# Patient Record
Sex: MALE | Employment: UNEMPLOYED | URBAN - NONMETROPOLITAN AREA
[De-identification: names, ages, dates, MRNs, and addresses within clinical notes are randomized per-mention and may not be internally consistent; named-entity substitution may affect disease eponyms.]

---

## 2021-10-25 ENCOUNTER — HOSPITAL ENCOUNTER (EMERGENCY)
Facility: HOSPITAL | Age: 26
Discharge: HOME | End: 2021-10-25
Attending: STUDENT IN AN ORGANIZED HEALTH CARE EDUCATION/TRAINING PROGRAM
Payer: MEDICARE

## 2021-10-25 VITALS
HEIGHT: 77 IN | TEMPERATURE: 97.7 F | SYSTOLIC BLOOD PRESSURE: 104 MMHG | OXYGEN SATURATION: 98 % | HEART RATE: 65 BPM | WEIGHT: 177.91 LBS | BODY MASS INDEX: 21.01 KG/M2 | DIASTOLIC BLOOD PRESSURE: 59 MMHG | RESPIRATION RATE: 21 BRPM

## 2021-10-25 DIAGNOSIS — G40.919 BREAKTHROUGH SEIZURE (HCC): Primary | ICD-10-CM

## 2021-10-25 LAB
ALBUMIN SERPL BCP-MCNC: 3.8 G/DL (ref 3.5–5)
ALP SERPL-CCNC: 75 U/L (ref 46–116)
ALT SERPL W P-5'-P-CCNC: 14 U/L (ref 12–78)
ANION GAP SERPL CALCULATED.3IONS-SCNC: 6 MMOL/L (ref 4–13)
AST SERPL W P-5'-P-CCNC: 14 U/L (ref 5–45)
ATRIAL RATE: 82 BPM
BASOPHILS # BLD AUTO: 0.06 THOUSANDS/ΜL (ref 0–0.1)
BASOPHILS NFR BLD AUTO: 1 % (ref 0–1)
BILIRUB SERPL-MCNC: 0.45 MG/DL (ref 0.2–1)
BUN SERPL-MCNC: 13 MG/DL (ref 5–25)
CALCIUM SERPL-MCNC: 8.7 MG/DL (ref 8.3–10.1)
CHLORIDE SERPL-SCNC: 103 MMOL/L (ref 100–108)
CO2 SERPL-SCNC: 27 MMOL/L (ref 21–32)
CREAT SERPL-MCNC: 1.17 MG/DL (ref 0.6–1.3)
EOSINOPHIL # BLD AUTO: 0.1 THOUSAND/ΜL (ref 0–0.61)
EOSINOPHIL NFR BLD AUTO: 2 % (ref 0–6)
ERYTHROCYTE [DISTWIDTH] IN BLOOD BY AUTOMATED COUNT: 12.1 % (ref 11.6–15.1)
GFR SERPL CREATININE-BSD FRML MDRD: 85 ML/MIN/1.73SQ M
GLUCOSE SERPL-MCNC: 74 MG/DL (ref 65–140)
GLUCOSE SERPL-MCNC: 86 MG/DL (ref 65–140)
HCT VFR BLD AUTO: 44.5 % (ref 36.5–49.3)
HGB BLD-MCNC: 14.9 G/DL (ref 12–17)
IMM GRANULOCYTES # BLD AUTO: 0.02 THOUSAND/UL (ref 0–0.2)
IMM GRANULOCYTES NFR BLD AUTO: 0 % (ref 0–2)
LYMPHOCYTES # BLD AUTO: 1.29 THOUSANDS/ΜL (ref 0.6–4.47)
LYMPHOCYTES NFR BLD AUTO: 24 % (ref 14–44)
MCH RBC QN AUTO: 32.9 PG (ref 26.8–34.3)
MCHC RBC AUTO-ENTMCNC: 33.5 G/DL (ref 31.4–37.4)
MCV RBC AUTO: 98 FL (ref 82–98)
MONOCYTES # BLD AUTO: 0.45 THOUSAND/ΜL (ref 0.17–1.22)
MONOCYTES NFR BLD AUTO: 8 % (ref 4–12)
NEUTROPHILS # BLD AUTO: 3.47 THOUSANDS/ΜL (ref 1.85–7.62)
NEUTS SEG NFR BLD AUTO: 65 % (ref 43–75)
NRBC BLD AUTO-RTO: 0 /100 WBCS
P AXIS: 72 DEGREES
PLATELET # BLD AUTO: 220 THOUSANDS/UL (ref 149–390)
PMV BLD AUTO: 8.8 FL (ref 8.9–12.7)
POTASSIUM SERPL-SCNC: 4.9 MMOL/L (ref 3.5–5.3)
PR INTERVAL: 170 MS
PROT SERPL-MCNC: 7.1 G/DL (ref 6.4–8.2)
QRS AXIS: 55 DEGREES
QRSD INTERVAL: 84 MS
QT INTERVAL: 388 MS
QTC INTERVAL: 453 MS
RBC # BLD AUTO: 4.53 MILLION/UL (ref 3.88–5.62)
SODIUM SERPL-SCNC: 136 MMOL/L (ref 136–145)
T WAVE AXIS: 46 DEGREES
VENTRICULAR RATE: 82 BPM
WBC # BLD AUTO: 5.39 THOUSAND/UL (ref 4.31–10.16)

## 2021-10-25 PROCEDURE — 93005 ELECTROCARDIOGRAM TRACING: CPT

## 2021-10-25 PROCEDURE — 36415 COLL VENOUS BLD VENIPUNCTURE: CPT | Performed by: PHYSICIAN ASSISTANT

## 2021-10-25 PROCEDURE — 80177 DRUG SCRN QUAN LEVETIRACETAM: CPT | Performed by: PHYSICIAN ASSISTANT

## 2021-10-25 PROCEDURE — 85025 COMPLETE CBC W/AUTO DIFF WBC: CPT | Performed by: PHYSICIAN ASSISTANT

## 2021-10-25 PROCEDURE — 93010 ELECTROCARDIOGRAM REPORT: CPT | Performed by: INTERNAL MEDICINE

## 2021-10-25 PROCEDURE — 99284 EMERGENCY DEPT VISIT MOD MDM: CPT

## 2021-10-25 PROCEDURE — 99284 EMERGENCY DEPT VISIT MOD MDM: CPT | Performed by: PHYSICIAN ASSISTANT

## 2021-10-25 PROCEDURE — 80053 COMPREHEN METABOLIC PANEL: CPT | Performed by: PHYSICIAN ASSISTANT

## 2021-10-25 PROCEDURE — 82948 REAGENT STRIP/BLOOD GLUCOSE: CPT

## 2021-10-25 RX ORDER — LEVETIRACETAM 500 MG/1
1000 TABLET ORAL EVERY 12 HOURS SCHEDULED
COMMUNITY
End: 2021-10-25

## 2021-10-25 RX ORDER — LEVETIRACETAM 750 MG/1
750 TABLET ORAL 2 TIMES DAILY
Qty: 60 TABLET | Refills: 0 | Status: SHIPPED | OUTPATIENT
Start: 2021-10-25 | End: 2021-11-24

## 2021-10-25 RX ADMIN — LEVETIRACETAM 1500 MG: 100 INJECTION, SOLUTION INTRAVENOUS at 13:58

## 2021-10-27 ENCOUNTER — TELEPHONE (OUTPATIENT)
Dept: NEUROLOGY | Facility: CLINIC | Age: 26
End: 2021-10-27

## 2021-10-27 LAB — LEVETIRACETAM SERPL-MCNC: 15.6 UG/ML (ref 10–40)

## 2024-06-25 ENCOUNTER — OFFICE VISIT (OUTPATIENT)
Dept: FAMILY MEDICINE | Facility: CLINIC | Age: 29
End: 2024-06-25
Payer: COMMERCIAL

## 2024-06-25 VITALS
HEART RATE: 69 BPM | HEIGHT: 77 IN | SYSTOLIC BLOOD PRESSURE: 112 MMHG | RESPIRATION RATE: 16 BRPM | DIASTOLIC BLOOD PRESSURE: 68 MMHG | WEIGHT: 193.6 LBS | BODY MASS INDEX: 22.86 KG/M2 | OXYGEN SATURATION: 99 %

## 2024-06-25 DIAGNOSIS — R41.3 MEMORY IMPAIRMENT: ICD-10-CM

## 2024-06-25 DIAGNOSIS — F32.A ANXIETY AND DEPRESSION: ICD-10-CM

## 2024-06-25 DIAGNOSIS — F41.9 ANXIETY AND DEPRESSION: ICD-10-CM

## 2024-06-25 DIAGNOSIS — G40.909 SEIZURE DISORDER (CMS/HCC): Primary | ICD-10-CM

## 2024-06-25 PROCEDURE — 99203 OFFICE O/P NEW LOW 30 MIN: CPT | Performed by: NURSE PRACTITIONER

## 2024-06-25 PROCEDURE — 3008F BODY MASS INDEX DOCD: CPT | Performed by: NURSE PRACTITIONER

## 2024-06-25 RX ORDER — LEVETIRACETAM 1000 MG/1
1000 TABLET ORAL 2 TIMES DAILY
COMMUNITY
End: 2024-06-25

## 2024-06-25 RX ORDER — ESCITALOPRAM OXALATE 5 MG/1
5 TABLET ORAL DAILY
COMMUNITY
Start: 2024-03-26 | End: 2024-07-24

## 2024-06-25 RX ORDER — LACOSAMIDE 100 MG/1
TABLET ORAL
COMMUNITY

## 2024-06-25 RX ORDER — LACOSAMIDE 200 MG/1
200 TABLET ORAL EVERY 12 HOURS
COMMUNITY
Start: 2024-06-06 | End: 2025-06-06

## 2024-06-25 RX ORDER — ZONISAMIDE 100 MG/1
400 CAPSULE ORAL DAILY
COMMUNITY
Start: 2024-01-28

## 2024-06-25 RX ORDER — QUETIAPINE FUMARATE 25 MG/1
25 TABLET, FILM COATED ORAL NIGHTLY
COMMUNITY
Start: 2024-03-26

## 2024-06-25 RX ORDER — LORAZEPAM 1 MG/1
1 TABLET ORAL DAILY PRN
COMMUNITY
Start: 2024-02-26

## 2024-06-25 ASSESSMENT — ENCOUNTER SYMPTOMS
SEIZURES: 0
WHEEZING: 0
DYSPHORIC MOOD: 1
NERVOUS/ANXIOUS: 1
SHORTNESS OF BREATH: 0
COUGH: 0
PALPITATIONS: 0

## 2024-06-25 ASSESSMENT — PATIENT HEALTH QUESTIONNAIRE - PHQ9: SUM OF ALL RESPONSES TO PHQ9 QUESTIONS 1 & 2: 0

## 2024-06-25 NOTE — PROGRESS NOTES
Reason for visit:   Chief Complaint   Patient presents with    Seizures       HPI  is a 28 y.o. male     HPI    Here to establish care.     Epilepsy: now well controlled on medication since med change 2/2024, previously was having seizures about monthly, following with [facility]. Lorazapem was for PRN use for seizures but hasn't needed   Depression/anxiety: following with [facility] (dr gallito england), referred to a few different programs, will be starting therapy soon. Quietapine is meant to be taken PRN before bed buit he has not yet had to take it.  Memory/speech issues: seizure related, neurospych referred him to an epilepsy specific program called [facility] (scheduled to start around September)     Hx of skull surgery in first year of life- craniosynostosis.     Problem List:  Patient Active Problem List    Diagnosis Date Noted    Seizure disorder (CMS/[facility]) 06/25/2024    Anxiety and depression 06/25/2024    Memory impairment 06/25/2024       Surgical History:  No past surgical history on file.    Social History:  Social History     Social History Narrative    Not on file       Family History:  No family history on file.    Allergies:  Patient has no known allergies.    Current Medications:  Current Outpatient Medications   Medication Sig Dispense Refill    escitalopram (LEXAPRO) 5 mg tablet Take 5 mg by mouth daily.      lacosamide (VIMPAT) 100 mg tablet TAKE 1 TABLET BY MOUTH EVERY 12 HOURS WITH 200MG TABLET TO MAKE 300MG TWICE DAILY      lacosamide (VIMPAT) 200 mg tablet tablet Take 200 mg by mouth every 12 (twelve) hours.      LORazepam (ATIVAN) 1 mg tablet Take 1 mg by mouth daily as needed for anxiety.      QUEtiapine (SEROquel) 25 mg tablet Take 25 mg by mouth nightly.      zonisamide (ZONEGRAN) 100 mg capsule Take 400 mg by mouth daily. Takes four capsules daily       No current facility-administered medications for this visit.         Review of Systems:  Review of Systems  "  Respiratory:  Negative for cough, shortness of breath and wheezing.    Cardiovascular:  Negative for chest pain and palpitations.   Neurological:  Negative for seizures.   Psychiatric/Behavioral:  Positive for dysphoric mood. The patient is nervous/anxious.        Objective     Vital Signs:  Vitals:    06/25/24 0935   BP: 112/68   Pulse: 69   Resp: 16   SpO2: 99%       BMI:  Body mass index is 23.1 kg/m².     Physical Exam  Constitutional:       Appearance: Normal appearance.   Cardiovascular:      Rate and Rhythm: Normal rate and regular rhythm.   Pulmonary:      Effort: Pulmonary effort is normal.      Breath sounds: Normal breath sounds.   Neurological:      Mental Status: He is alert and oriented to person, place, and time.         Recent labs before today:     No results found for: \"WBC\", \"HGB\", \"HCT\", \"PLT\", \"CHOL\", \"TRIG\", \"HDL\", \"LDLC\", \"ALT\", \"AST\", \"NA\", \"K\", \"CL\", \"CREATININE\", \"BUN\", \"CO2\", \"TSH\", \"PSA\", \"INR\", \"GLUC\", \"HGBA1C\", \"MICROALBUR\"    There are no Patient Instructions on file for this visit.  If you do not hear from me regarding results in 7-10 days either via a call or the portal please call.      Problem List Items Addressed This Visit          Nervous    Seizure disorder (CMS/HCC) - Primary     Following with [facility]  Started at 23yo after head trauma, though unclear if that was what sparked the seizure activity  He had difficulty controlling seizures x years, and then 2/2024 started on a regimen that has worked very well for him and has been seizure free since that time  Last c/s note reviewed  Will follow          Relevant Medications    lacosamide (VIMPAT) 100 mg tablet    lacosamide (VIMPAT) 200 mg tablet tablet    zonisamide (ZONEGRAN) 100 mg capsule       Mental Health    Anxiety and depression     Following with neuropsych  Doing much better since starting lexapro 5mg   Seroquel is intended for PRN use for before bed but he has never needed it  He will soon be starting " care with a specialized therapist         Relevant Medications    QUEtiapine (SEROquel) 25 mg tablet    escitalopram (LEXAPRO) 5 mg tablet    LORazepam (ATIVAN) 1 mg tablet       Other    Memory impairment     R/t seizure d/o, also speech affected at times  Much better recently  Following with neuropsych                 RAS Lewis  6/25/2024

## 2024-06-25 NOTE — ASSESSMENT & PLAN NOTE
Following with [facility]  Started at 23yo after head trauma, though unclear if that was what sparked the seizure activity  He had difficulty controlling seizures x years, and then 2/2024 started on a regimen that has worked very well for him and has been seizure free since that time  Last c/s note reviewed  Will follow

## 2024-06-25 NOTE — ASSESSMENT & PLAN NOTE
Following with neuropsych  Doing much better since starting lexapro 5mg   Seroquel is intended for PRN use for before bed but he has never needed it  He will soon be starting care with a specialized therapist